# Patient Record
Sex: MALE | Race: WHITE | NOT HISPANIC OR LATINO | URBAN - METROPOLITAN AREA
[De-identification: names, ages, dates, MRNs, and addresses within clinical notes are randomized per-mention and may not be internally consistent; named-entity substitution may affect disease eponyms.]

---

## 2018-05-03 ENCOUNTER — TELEPHONE (OUTPATIENT)
Dept: CARDIOLOGY | Facility: MEDICAL CENTER | Age: 43
End: 2018-05-03

## 2018-05-03 NOTE — TELEPHONE ENCOUNTER
Called patient to find out if he has any of his cardiac records from his cardiologist in [place]. Patient stated he will be contacting the office and will have all records sent over to him to bring into his appointment or he will have them faxed over to us depending on his prior cardiology office protocol. Patient has a NP appt. With RO on 5/4/2018 @ 2:20pm*MERCED

## 2018-05-04 ENCOUNTER — OFFICE VISIT (OUTPATIENT)
Dept: CARDIOLOGY | Facility: PHYSICIAN GROUP | Age: 43
End: 2018-05-04
Payer: MEDICARE

## 2018-05-04 VITALS
HEART RATE: 78 BPM | HEIGHT: 71 IN | WEIGHT: 197 LBS | OXYGEN SATURATION: 95 % | BODY MASS INDEX: 27.58 KG/M2 | DIASTOLIC BLOOD PRESSURE: 86 MMHG | SYSTOLIC BLOOD PRESSURE: 138 MMHG

## 2018-05-04 DIAGNOSIS — I48.91 ATRIAL FIBRILLATION, UNSPECIFIED TYPE (HCC): ICD-10-CM

## 2018-05-04 DIAGNOSIS — I50.20 ACC/AHA STAGE C SYSTOLIC CONGESTIVE HEART FAILURE (HCC): ICD-10-CM

## 2018-05-04 DIAGNOSIS — I48.0 PAROXYSMAL ATRIAL FIBRILLATION (HCC): ICD-10-CM

## 2018-05-04 DIAGNOSIS — R60.0 LOCALIZED EDEMA: ICD-10-CM

## 2018-05-04 DIAGNOSIS — F15.10 METHAMPHETAMINE USE (HCC): ICD-10-CM

## 2018-05-04 DIAGNOSIS — F10.20 ALCOHOLISM (HCC): ICD-10-CM

## 2018-05-04 DIAGNOSIS — K70.31 ALCOHOLIC CIRRHOSIS OF LIVER WITH ASCITES (HCC): ICD-10-CM

## 2018-05-04 PROCEDURE — 99205 OFFICE O/P NEW HI 60 MIN: CPT | Performed by: INTERNAL MEDICINE

## 2018-05-04 PROCEDURE — 93000 ELECTROCARDIOGRAM COMPLETE: CPT | Performed by: INTERNAL MEDICINE

## 2018-05-04 RX ORDER — SPIRONOLACTONE 25 MG/1
25 TABLET ORAL DAILY
Qty: 30 TAB | Refills: 11 | Status: SHIPPED | OUTPATIENT
Start: 2018-05-04 | End: 2018-06-01

## 2018-05-04 RX ORDER — LITHIUM CARBONATE 450 MG
TABLET, EXTENDED RELEASE ORAL
Refills: 0 | COMMUNITY
Start: 2018-04-28

## 2018-05-04 RX ORDER — QUETIAPINE FUMARATE 400 MG/1
TABLET, FILM COATED ORAL
Refills: 1 | COMMUNITY
Start: 2018-04-28

## 2018-05-04 RX ORDER — LEVOTHYROXINE SODIUM 0.03 MG/1
TABLET ORAL
Refills: 3 | COMMUNITY
Start: 2018-03-20

## 2018-05-04 RX ORDER — QUETIAPINE FUMARATE 50 MG/1
TABLET, FILM COATED ORAL
Refills: 1 | COMMUNITY
Start: 2018-04-28

## 2018-05-04 ASSESSMENT — ENCOUNTER SYMPTOMS
COUGH: 0
DIZZINESS: 0
WEAKNESS: 0
ORTHOPNEA: 0
BRUISES/BLEEDS EASILY: 0
CLAUDICATION: 0
ABDOMINAL PAIN: 1
PALPITATIONS: 0
EYES NEGATIVE: 1
FEVER: 0
CHILLS: 0
NAUSEA: 1
SHORTNESS OF BREATH: 1
SPUTUM PRODUCTION: 0
LOSS OF CONSCIOUSNESS: 0
STRIDOR: 0
WHEEZING: 1
CONSTITUTIONAL NEGATIVE: 1
PND: 0
MUSCULOSKELETAL NEGATIVE: 1
HEMOPTYSIS: 0
SORE THROAT: 0
NEUROLOGICAL NEGATIVE: 1

## 2018-05-04 NOTE — PROGRESS NOTES
Chief Complaint   Patient presents with   • Atrial Fibrillation     New patient        Subjective:   Rakan Gaston is a 43 y.o. male who presents today as a new consultation for edema and SOB.  He has a PMH of a-fib and systolic CHF from 4316-8025 treated in the [place] area.  He thinks this was likely from a combination of meth and ETOH.  He is sober from meth but stopped drinking 7 months ago.  Today he does smell somewhat like alcohol.  He has developed LE edema, SOB and abdominal pain.  He says his belly started swelling and that he might have just a beer belly.  He was not started on medications for this but was ordered for some testing which he did not get completed because he said his co-pay was too high.  He is having back pain, swelling and thirst.  He denies chest pain but endorses edema and PND.     History reviewed. No pertinent past medical history.  History reviewed. No pertinent surgical history.  History reviewed. No pertinent family history.  Social History     Social History   • Marital status: Unknown     Spouse name: N/A   • Number of children: N/A   • Years of education: N/A     Occupational History   • Not on file.     Social History Main Topics   • Smoking status: Current Every Day Smoker     Packs/day: 0.50     Types: Cigarettes   • Smokeless tobacco: Never Used   • Alcohol use No   • Drug use: Unknown   • Sexual activity: Not on file     Other Topics Concern   • Not on file     Social History Narrative   • No narrative on file     No Known Allergies  Outpatient Encounter Prescriptions as of 5/4/2018   Medication Sig Dispense Refill   • levothyroxine (SYNTHROID) 25 MCG Tab TK 1 T PO D  3   • lithium CR (ESKALITH CR) 450 MG Tab CR TK 2 TS PO QHS  0   • quetiapine (SEROQUEL) 400 MG tablet TK 1 T PO  QHS  1   • quetiapine (SEROQUEL) 50 MG tablet TK 1 T PO  BID PRF AGITATION  1     No facility-administered encounter medications on file as of 5/4/2018.      Review of Systems   Constitutional:  "Negative.  Negative for chills, fever and malaise/fatigue.   HENT: Negative.  Negative for sore throat.    Eyes: Negative.    Respiratory: Positive for shortness of breath and wheezing. Negative for cough, hemoptysis, sputum production and stridor.    Cardiovascular: Positive for leg swelling. Negative for chest pain, palpitations, orthopnea, claudication and PND.   Gastrointestinal: Positive for abdominal pain and nausea.   Genitourinary: Negative.    Musculoskeletal: Negative.    Skin: Negative.    Neurological: Negative.  Negative for dizziness, loss of consciousness and weakness.   Endo/Heme/Allergies: Negative.  Does not bruise/bleed easily.   All other systems reviewed and are negative.       Objective:   /86   Pulse 78   Ht 1.803 m (5' 11\")   Wt 89.4 kg (197 lb)   SpO2 95%   BMI 27.48 kg/m²     Physical Exam   Constitutional: He appears well-developed and well-nourished. No distress.   HENT:   Head: Normocephalic and atraumatic.   Right Ear: External ear normal.   Left Ear: External ear normal.   Nose: Nose normal.   Mouth/Throat: No oropharyngeal exudate.   Eyes: Conjunctivae and EOM are normal. Pupils are equal, round, and reactive to light. Right eye exhibits no discharge. Left eye exhibits no discharge. No scleral icterus.   Neck: Neck supple. No JVD present.   Cardiovascular: Normal rate, regular rhythm and intact distal pulses.  Exam reveals no gallop and no friction rub.    No murmur heard.  Pulmonary/Chest: Effort normal. No stridor. No respiratory distress. He has no wheezes. He has no rales. He exhibits no tenderness.   Abdominal: Soft. He exhibits no distension. There is no guarding.   Bulging flanks  No fluid wave  No caput medussa  No spider hemangiomas   Musculoskeletal: Normal range of motion. He exhibits no edema, tenderness or deformity.   Neurological: He is alert. He has normal reflexes. He displays normal reflexes. No cranial nerve deficit. He exhibits normal muscle tone. " Coordination normal.   Skin: Skin is warm and dry. No rash noted. He is not diaphoretic. No erythema. No pallor.   Psychiatric: He has a normal mood and affect. His behavior is normal. Judgment and thought content normal.   Nursing note and vitals reviewed.    EKG: Personally reviewed by myself  NSR, ELKE  Assessment:     1. Atrial fibrillation, unspecified type (HCC)  EKG   2. Paroxysmal atrial fibrillation (HCC)     3. ACC/AHA stage C systolic congestive heart failure (HCC)     4. Alcoholism (HCC)     5. Methamphetamine use     6. Localized edema     7. Alcoholic cirrhosis of liver with ascites (HCC)         Medical Decision Making:  Today's Assessment / Status / Plan:     42 yo M with what appears to be early cirrhosis.  I have spent considerable time reviewing his outside medical records. Considered as well would be pulmonary hypertension or CHF from ETOH.  Apparently he has both medicare and medicaid.  We can get a STAT abdominal U/S today and will get a cardiac echo next week.  In the meantime he will start on viola 25.  We will see him back in 2-4 weeks.    Greater than 45 minutes was spent in counseling the patient, reviewing records, and coordinating his care.

## 2018-05-04 NOTE — LETTER
[facility] [address]  Phone: [phone]  Fax: [phone]              Rakan Gaston  1975    Encounter Date: 5/4/2018    Andrea Stanton M.D.          PROGRESS NOTE:  Chief Complaint   Patient presents with   • Atrial Fibrillation     New patient        Subjective:   Rakan Gaston is a 43 y.o. male who presents today as a new consultation for edema and SOB.  He has a PMH of a-fib and systolic CHF from 5433-3447 treated in the [place] area.  He thinks this was likely from a combination of meth and ETOH.  He is sober from meth but stopped drinking 7 months ago.  Today he does smell somewhat like alcohol.  He has developed LE edema, SOB and abdominal pain.  He says his belly started swelling and that he might have just a beer belly.  He was not started on medications for this but was ordered for some testing which he did not get completed because he said his co-pay was too high.  He is having back pain, swelling and thirst.  He denies chest pain but endorses edema and PND.     History reviewed. No pertinent past medical history.  History reviewed. No pertinent surgical history.  History reviewed. No pertinent family history.  Social History     Social History   • Marital status: Unknown     Spouse name: N/A   • Number of children: N/A   • Years of education: N/A     Occupational History   • Not on file.     Social History Main Topics   • Smoking status: Current Every Day Smoker     Packs/day: 0.50     Types: Cigarettes   • Smokeless tobacco: Never Used   • Alcohol use No   • Drug use: Unknown   • Sexual activity: Not on file     Other Topics Concern   • Not on file     Social History Narrative   • No narrative on file     No Known Allergies  Outpatient Encounter Prescriptions as of 5/4/2018   Medication Sig Dispense Refill   • levothyroxine (SYNTHROID) 25 MCG Tab TK 1 T PO D  3   • lithium CR (ESKALITH CR) 450 MG Tab CR  "TK 2 TS PO QHS  0   • quetiapine (SEROQUEL) 400 MG tablet TK 1 T PO  QHS  1   • quetiapine (SEROQUEL) 50 MG tablet TK 1 T PO  BID PRF AGITATION  1     No facility-administered encounter medications on file as of 5/4/2018.      Review of Systems   Constitutional: Negative.  Negative for chills, fever and malaise/fatigue.   HENT: Negative.  Negative for sore throat.    Eyes: Negative.    Respiratory: Positive for shortness of breath and wheezing. Negative for cough, hemoptysis, sputum production and stridor.    Cardiovascular: Positive for leg swelling. Negative for chest pain, palpitations, orthopnea, claudication and PND.   Gastrointestinal: Positive for abdominal pain and nausea.   Genitourinary: Negative.    Musculoskeletal: Negative.    Skin: Negative.    Neurological: Negative.  Negative for dizziness, loss of consciousness and weakness.   Endo/Heme/Allergies: Negative.  Does not bruise/bleed easily.   All other systems reviewed and are negative.       Objective:   /86   Pulse 78   Ht 1.803 m (5' 11\")   Wt 89.4 kg (197 lb)   SpO2 95%   BMI 27.48 kg/m²      Physical Exam   Constitutional: He appears well-developed and well-nourished. No distress.   HENT:   Head: Normocephalic and atraumatic.   Right Ear: External ear normal.   Left Ear: External ear normal.   Nose: Nose normal.   Mouth/Throat: No oropharyngeal exudate.   Eyes: Conjunctivae and EOM are normal. Pupils are equal, round, and reactive to light. Right eye exhibits no discharge. Left eye exhibits no discharge. No scleral icterus.   Neck: Neck supple. No JVD present.   Cardiovascular: Normal rate, regular rhythm and intact distal pulses.  Exam reveals no gallop and no friction rub.    No murmur heard.  Pulmonary/Chest: Effort normal. No stridor. No respiratory distress. He has no wheezes. He has no rales. He exhibits no tenderness.   Abdominal: Soft. He exhibits no distension. There is no guarding.   Bulging flanks  No fluid wave  No caput " medussa  No spider hemangiomas   Musculoskeletal: Normal range of motion. He exhibits no edema, tenderness or deformity.   Neurological: He is alert. He has normal reflexes. He displays normal reflexes. No cranial nerve deficit. He exhibits normal muscle tone. Coordination normal.   Skin: Skin is warm and dry. No rash noted. He is not diaphoretic. No erythema. No pallor.   Psychiatric: He has a normal mood and affect. His behavior is normal. Judgment and thought content normal.   Nursing note and vitals reviewed.    EKG: Personally reviewed by myself  NSR, NSIVGERMÁN  Assessment:     1. Atrial fibrillation, unspecified type (HCC)  EKG   2. Paroxysmal atrial fibrillation (HCC)     3. ACC/AHA stage C systolic congestive heart failure (HCC)     4. Alcoholism (HCC)     5. Methamphetamine use     6. Localized edema     7. Alcoholic cirrhosis of liver with ascites (HCC)         Medical Decision Making:  Today's Assessment / Status / Plan:     44 yo M with what appears to be early cirrhosis.  I have spent considerable time reviewing his outside medical records. Considered as well would be pulmonary hypertension or CHF from ETOH.  Apparently he has both medicare and medicaid.  We can get a STAT abdominal U/S today and will get a cardiac echo next week.  In the meantime he will start on viola 25.  We will see him back in 2-4 weeks.    Greater than 45 minutes was spent in counseling the patient, reviewing records, and coordinating his care.      Barney Forman M.D.  [address] [facility] [address]  VIA Facsimile: [phone]

## 2018-05-07 DIAGNOSIS — F10.20 ALCOHOLISM (HCC): ICD-10-CM

## 2018-05-07 DIAGNOSIS — I50.20 ACC/AHA STAGE C SYSTOLIC CONGESTIVE HEART FAILURE (HCC): ICD-10-CM

## 2018-05-07 DIAGNOSIS — K70.31 ALCOHOLIC CIRRHOSIS OF LIVER WITH ASCITES (HCC): ICD-10-CM

## 2018-05-30 ENCOUNTER — TELEPHONE (OUTPATIENT)
Dept: CARDIOLOGY | Facility: MEDICAL CENTER | Age: 43
End: 2018-05-30

## 2018-05-30 NOTE — TELEPHONE ENCOUNTER
US results   Received: Today   Message Contents   Reina Encarnacion, Tavon Ass't  Bozena Goodwin R.N.   Phone Number: [phone]             CHECO Seals,     Just in case you got this mssg stating for Pt. Alexander Leonardo. It is actually for this Pt Rakan Gaston. So sorry wanted to resend this right away. Rakan called and wanted to get his ultrasound results. He would like a call back at: [phone].     Thank you so much,     Reina      Contacted patient, discussed ultrasound results.  Patient inquired what he should do to avoid progression.  Advised to stop drinking.  Patient verbalizes understanding.

## 2018-05-31 ENCOUNTER — TELEPHONE (OUTPATIENT)
Dept: CARDIOLOGY | Facility: MEDICAL CENTER | Age: 43
End: 2018-05-31

## 2018-05-31 NOTE — TELEPHONE ENCOUNTER
Called patient to find out if he ever had the ECHO done that was ordered by RO on 5/4/18. Per scheduling notes echo was scheduled at [facility] in [place] on 5/9/18, Called  and they stated patient never went in to get the echo done. Patient has a FV with RO on 6/1/18 @ 11:20am*MERCED

## 2018-06-01 ENCOUNTER — OFFICE VISIT (OUTPATIENT)
Dept: CARDIOLOGY | Facility: PHYSICIAN GROUP | Age: 43
End: 2018-06-01
Payer: MEDICARE

## 2018-06-01 VITALS
HEART RATE: 78 BPM | BODY MASS INDEX: 28 KG/M2 | HEIGHT: 71 IN | OXYGEN SATURATION: 95 % | SYSTOLIC BLOOD PRESSURE: 110 MMHG | WEIGHT: 200 LBS | DIASTOLIC BLOOD PRESSURE: 80 MMHG

## 2018-06-01 DIAGNOSIS — K70.31 ALCOHOLIC CIRRHOSIS OF LIVER WITH ASCITES (HCC): ICD-10-CM

## 2018-06-01 DIAGNOSIS — R06.02 SOB (SHORTNESS OF BREATH): ICD-10-CM

## 2018-06-01 DIAGNOSIS — I50.20 ACC/AHA STAGE C SYSTOLIC CONGESTIVE HEART FAILURE (HCC): ICD-10-CM

## 2018-06-01 DIAGNOSIS — F10.20 ALCOHOLISM (HCC): ICD-10-CM

## 2018-06-01 PROCEDURE — 99214 OFFICE O/P EST MOD 30 MIN: CPT | Performed by: INTERNAL MEDICINE

## 2018-06-01 RX ORDER — SPIRONOLACTONE 50 MG/1
50 TABLET, FILM COATED ORAL DAILY
Qty: 30 TAB | Refills: 3 | Status: SHIPPED | OUTPATIENT
Start: 2018-06-01

## 2018-06-01 ASSESSMENT — ENCOUNTER SYMPTOMS
SPUTUM PRODUCTION: 0
CONSTITUTIONAL NEGATIVE: 1
LOSS OF CONSCIOUSNESS: 0
COUGH: 0
CARDIOVASCULAR NEGATIVE: 1
SORE THROAT: 0
HEMOPTYSIS: 0
STRIDOR: 0
NEUROLOGICAL NEGATIVE: 1
DIZZINESS: 0
MUSCULOSKELETAL NEGATIVE: 1
RESPIRATORY NEGATIVE: 1
ORTHOPNEA: 0
PALPITATIONS: 0
PND: 0
EYES NEGATIVE: 1
GASTROINTESTINAL NEGATIVE: 1
SHORTNESS OF BREATH: 0
FEVER: 0
WEAKNESS: 0
BRUISES/BLEEDS EASILY: 0
WHEEZING: 0
CHILLS: 0
CLAUDICATION: 0

## 2018-06-01 NOTE — PROGRESS NOTES
Chief Complaint   Patient presents with   • Atrial Fibrillation     Follow up       Subjective:   Rakan Gaston is a 43 y.o. male who presents As a follow-up for shortness of breath and lower extremity edema.  He was not able to complete his echocardiogram.  He did get an abdominal ultrasound  showing early signs of cirrhosis.  He is responding somewhat to the low-dose aspirin lactone and appears to have better controlled and resolving lower extremity edema.  His shortness of breath is mildly improved.  He does wake up with PND it sounds like at night.  He also changed his diet.    History reviewed. No pertinent past medical history.  History reviewed. No pertinent surgical history.  History reviewed. No pertinent family history.  Social History     Social History   • Marital status: Single     Spouse name: N/A   • Number of children: N/A   • Years of education: N/A     Occupational History   • Not on file.     Social History Main Topics   • Smoking status: Current Every Day Smoker     Packs/day: 0.50     Types: Cigarettes   • Smokeless tobacco: Never Used   • Alcohol use No   • Drug use: Unknown   • Sexual activity: Not on file     Other Topics Concern   • Not on file     Social History Narrative   • No narrative on file     No Known Allergies  Outpatient Encounter Prescriptions as of 6/1/2018   Medication Sig Dispense Refill   • spironolactone (ALDACTONE) 50 MG Tab Take 1 Tab by mouth every day. 30 Tab 3   • levothyroxine (SYNTHROID) 25 MCG Tab TK 1 T PO D  3   • lithium CR (ESKALITH CR) 450 MG Tab CR TK 2 TS PO QHS  0   • quetiapine (SEROQUEL) 400 MG tablet TK 1 T PO  QHS  1   • quetiapine (SEROQUEL) 50 MG tablet TK 1 T PO  BID PRF AGITATION  1   • [DISCONTINUED] spironolactone (ALDACTONE) 25 MG Tab Take 1 Tab by mouth every day. 30 Tab 11     No facility-administered encounter medications on file as of 6/1/2018.      Review of Systems   Constitutional: Negative.  Negative for chills, fever and malaise/fatigue.  "  HENT: Negative.  Negative for sore throat.    Eyes: Negative.    Respiratory: Negative.  Negative for cough, hemoptysis, sputum production, shortness of breath, wheezing and stridor.    Cardiovascular: Negative.  Negative for chest pain, palpitations, orthopnea, claudication, leg swelling and PND.   Gastrointestinal: Negative.    Genitourinary: Negative.    Musculoskeletal: Negative.    Skin: Negative.    Neurological: Negative.  Negative for dizziness, loss of consciousness and weakness.   Endo/Heme/Allergies: Negative.  Does not bruise/bleed easily.   All other systems reviewed and are negative.       Objective:   /80   Pulse 78   Ht 1.803 m (5' 11\")   Wt 90.7 kg (200 lb)   SpO2 95%   BMI 27.89 kg/m²     Physical Exam   Constitutional: He appears well-developed and well-nourished. No distress.   HENT:   Head: Normocephalic and atraumatic.   Right Ear: External ear normal.   Left Ear: External ear normal.   Nose: Nose normal.   Mouth/Throat: No oropharyngeal exudate.   Eyes: Conjunctivae and EOM are normal. Pupils are equal, round, and reactive to light. Right eye exhibits no discharge. Left eye exhibits no discharge. No scleral icterus.   Neck: Neck supple. No JVD present.   Cardiovascular: Normal rate, regular rhythm and intact distal pulses.  Exam reveals no gallop and no friction rub.    No murmur heard.  Pulmonary/Chest: Effort normal. No stridor. No respiratory distress. He has no wheezes. He has no rales. He exhibits no tenderness.   Abdominal: Soft. He exhibits no distension. There is no guarding.   Musculoskeletal: Normal range of motion. He exhibits no edema, tenderness or deformity.   Neurological: He is alert. He has normal reflexes. He displays normal reflexes. No cranial nerve deficit. He exhibits normal muscle tone. Coordination normal.   Skin: Skin is warm and dry. No rash noted. He is not diaphoretic. No erythema. No pallor.   Psychiatric: He has a normal mood and affect. His behavior " is normal. Judgment and thought content normal.   Nursing note and vitals reviewed.      Assessment:     1. ACC/AHA stage C systolic congestive heart failure (HCC)  US-ABDOMEN COMPLETE SURVEY    spironolactone (ALDACTONE) 50 MG Tab   2. Alcoholism (HCC)  US-ABDOMEN COMPLETE SURVEY    spironolactone (ALDACTONE) 50 MG Tab   3. Alcoholic cirrhosis of liver with ascites (HCC)  US-ABDOMEN COMPLETE SURVEY    spironolactone (ALDACTONE) 50 MG Tab   4. SOB (shortness of breath)  OVERNIGHT PULSE OXIMETRY       Medical Decision Making:  Today's Assessment / Status / Plan:   . 43-year-old male with early findings of cirrhosis with a history of heart failure At this point I will increase his spironolactone.  I would like to get an echocardiogram however this is cost prohibitive and not able to be completed.  We will therefore treat him presumptively.  I will increase his prolactin to 50.  We are awaiting consultation with GI next week.

## 2018-07-24 ENCOUNTER — TELEPHONE (OUTPATIENT)
Dept: CARDIOLOGY | Facility: MEDICAL CENTER | Age: 43
End: 2018-07-24

## 2018-08-29 ENCOUNTER — OFFICE VISIT (OUTPATIENT)
Dept: CARDIOLOGY | Facility: PHYSICIAN GROUP | Age: 43
End: 2018-08-29
Payer: MEDICARE

## 2018-08-29 VITALS
BODY MASS INDEX: 27.72 KG/M2 | HEIGHT: 71 IN | DIASTOLIC BLOOD PRESSURE: 76 MMHG | WEIGHT: 198 LBS | HEART RATE: 60 BPM | OXYGEN SATURATION: 97 % | SYSTOLIC BLOOD PRESSURE: 130 MMHG

## 2018-08-29 DIAGNOSIS — K70.31 ALCOHOLIC CIRRHOSIS OF LIVER WITH ASCITES (HCC): ICD-10-CM

## 2018-08-29 DIAGNOSIS — I50.20 ACC/AHA STAGE C SYSTOLIC CONGESTIVE HEART FAILURE (HCC): ICD-10-CM

## 2018-08-29 DIAGNOSIS — I48.0 PAROXYSMAL ATRIAL FIBRILLATION (HCC): ICD-10-CM

## 2018-08-29 DIAGNOSIS — F10.20 ALCOHOLISM (HCC): ICD-10-CM

## 2018-08-29 DIAGNOSIS — R06.02 SOB (SHORTNESS OF BREATH): ICD-10-CM

## 2018-08-29 PROCEDURE — 99214 OFFICE O/P EST MOD 30 MIN: CPT | Performed by: INTERNAL MEDICINE

## 2018-08-29 ASSESSMENT — ENCOUNTER SYMPTOMS
CONSTITUTIONAL NEGATIVE: 1
WHEEZING: 0
CARDIOVASCULAR NEGATIVE: 1
GASTROINTESTINAL NEGATIVE: 1
SHORTNESS OF BREATH: 0
ORTHOPNEA: 0
CLAUDICATION: 0
LOSS OF CONSCIOUSNESS: 0
MUSCULOSKELETAL NEGATIVE: 1
BRUISES/BLEEDS EASILY: 0
NEUROLOGICAL NEGATIVE: 1
EYES NEGATIVE: 1
PND: 0
CHILLS: 0
STRIDOR: 0
RESPIRATORY NEGATIVE: 1
WEAKNESS: 0
PALPITATIONS: 0
DIZZINESS: 0
SPUTUM PRODUCTION: 0
COUGH: 0
HEMOPTYSIS: 0
FEVER: 0
SORE THROAT: 0

## 2018-08-29 NOTE — PROGRESS NOTES
Chief Complaint   Patient presents with   • Congestive Heart Failure       Subjective:   Rakan Gaston is a 43 y.o. male who presents today as a follow-up for his history of atrial fibrillation and heart failure.  He is now fully insured.  His lower extremity edema has essentially resolved on 50 mg of spironolactone.  His blood pressures well controlled now.  He reports sobriety from illicit substances.  His biggest issue is been having PND lately where he wakes up at night with the sensation of having a panic attack.  He is concerned that perhaps that this is his Seroquel dosing.  We still have an echocardiogram to reflect what his ejection fraction is.    History reviewed. No pertinent past medical history.  History reviewed. No pertinent surgical history.  History reviewed. No pertinent family history.  Social History     Social History   • Marital status: Single     Spouse name: N/A   • Number of children: N/A   • Years of education: N/A     Occupational History   • Not on file.     Social History Main Topics   • Smoking status: Current Every Day Smoker     Packs/day: 0.50     Types: Cigarettes   • Smokeless tobacco: Never Used   • Alcohol use Yes      Comment: occasional   • Drug use: No   • Sexual activity: Not on file     Other Topics Concern   • Not on file     Social History Narrative   • No narrative on file     No Known Allergies  Outpatient Encounter Prescriptions as of 8/29/2018   Medication Sig Dispense Refill   • spironolactone (ALDACTONE) 50 MG Tab Take 1 Tab by mouth every day. 30 Tab 3   • levothyroxine (SYNTHROID) 25 MCG Tab TK 1 T PO D  3   • lithium CR (ESKALITH CR) 450 MG Tab CR TK 2 TS PO QHS  0   • quetiapine (SEROQUEL) 50 MG tablet TK 1 T PO  BID PRF AGITATION  1   • quetiapine (SEROQUEL) 400 MG tablet TK 1 T PO  QHS  1     No facility-administered encounter medications on file as of 8/29/2018.      Review of Systems   Constitutional: Negative.  Negative for chills, fever and malaise/fatigue.  "  HENT: Negative.  Negative for sore throat.    Eyes: Negative.    Respiratory: Negative.  Negative for cough, hemoptysis, sputum production, shortness of breath, wheezing and stridor.    Cardiovascular: Negative.  Negative for chest pain, palpitations, orthopnea, claudication, leg swelling and PND.   Gastrointestinal: Negative.    Genitourinary: Negative.    Musculoskeletal: Negative.    Skin: Negative.    Neurological: Negative.  Negative for dizziness, loss of consciousness and weakness.   Endo/Heme/Allergies: Negative.  Does not bruise/bleed easily.   All other systems reviewed and are negative.       Objective:   /76   Pulse 60   Ht 1.803 m (5' 11\")   Wt 89.8 kg (198 lb)   SpO2 97%   BMI 27.62 kg/m²     Physical Exam   Constitutional: He appears well-developed and well-nourished. No distress.   HENT:   Head: Normocephalic and atraumatic.   Right Ear: External ear normal.   Left Ear: External ear normal.   Nose: Nose normal.   Mouth/Throat: No oropharyngeal exudate.   Eyes: Pupils are equal, round, and reactive to light. Conjunctivae and EOM are normal. Right eye exhibits no discharge. Left eye exhibits no discharge. No scleral icterus.   Neck: Neck supple. No JVD present.   Cardiovascular: Normal rate, regular rhythm and intact distal pulses.  Exam reveals no gallop and no friction rub.    No murmur heard.  Pulmonary/Chest: Effort normal. No stridor. No respiratory distress. He has no wheezes. He has no rales. He exhibits no tenderness.   Abdominal: Soft. He exhibits no distension. There is no guarding.   Musculoskeletal: Normal range of motion. He exhibits no edema, tenderness or deformity.   Neurological: He is alert. He has normal reflexes. He displays normal reflexes. No cranial nerve deficit. He exhibits normal muscle tone. Coordination normal.   Skin: Skin is warm and dry. No rash noted. He is not diaphoretic. No erythema. No pallor.   Psychiatric: He has a normal mood and affect. His behavior " is normal. Judgment and thought content normal.   Nursing note and vitals reviewed.      Assessment:     1. ACC/AHA stage C systolic congestive heart failure (HCC)  Echocardiogram Comp w/o Cont   2. Alcoholic cirrhosis of liver with ascites (HCC)  Echocardiogram Comp w/o Cont   3. Alcoholism (HCC)     4. SOB (shortness of breath)     5. Paroxysmal atrial fibrillation (HCC)  Echocardiogram Comp w/o Cont       Medical Decision Making:  Today's Assessment / Status / Plan:     43-year-old male with history of substance-induced cardia myopathy with atrial fibrillation.  We will get an echocardiogram today.  If it is normal we will send him for an event recorder to see if he is having paroxysmal atrial fibrillation at night.  We will see him back in 4 weeks.    Thank for you allowing me to take part in your patient's care, please call should you have any questions or would like to discuss this patient.

## 2018-10-23 ENCOUNTER — TELEPHONE (OUTPATIENT)
Dept: CARDIOLOGY | Facility: MEDICAL CENTER | Age: 43
End: 2018-10-23

## 2018-10-23 NOTE — TELEPHONE ENCOUNTER
Sent patient letter regarding [facility] late cancellations/no shows. Patient unable to schedule w/Cardiology w/o leadership approval.